# Patient Record
Sex: FEMALE | Race: WHITE | NOT HISPANIC OR LATINO | Employment: FULL TIME | URBAN - METROPOLITAN AREA
[De-identification: names, ages, dates, MRNs, and addresses within clinical notes are randomized per-mention and may not be internally consistent; named-entity substitution may affect disease eponyms.]

---

## 2019-12-05 ENCOUNTER — SOCIAL WORK (OUTPATIENT)
Dept: ADMINISTRATIVE | Facility: OTHER | Age: 29
End: 2019-12-05

## 2019-12-05 NOTE — PROGRESS NOTES
SW met with pt regarding initial OB assessment. Pt stated this is her 1st pregnancy/0-miscarriage. Pt stated lives with her fiancee and able to perform ADL's independently. Pt stated support system is her josé[employer]. Pt stated has medicaid([employer]). Pt stated does not have WIC. SW provide pt with information on other community resources and referred to the [facility].SW faxed and scanned pt's notification of pregnancy into epic.  No other needs identified at this time.    Carol Mclaughlin,MSW  Pager#[phone]

## 2019-12-30 ENCOUNTER — SOCIAL WORK (OUTPATIENT)
Dept: ADMINISTRATIVE | Facility: OTHER | Age: 29
End: 2019-12-30

## 2019-12-30 PROBLEM — O99.111 HETEROZYGOUS FACTOR V LEIDEN AFFECTING PREGNANCY IN FIRST TRIMESTER, ANTEPARTUM: Status: ACTIVE | Noted: 2019-12-30

## 2019-12-30 PROBLEM — Z3A.10 10 WEEKS GESTATION OF PREGNANCY: Status: ACTIVE | Noted: 2019-12-30

## 2019-12-30 PROBLEM — I10 CHRONIC HYPERTENSION: Status: ACTIVE | Noted: 2019-12-30

## 2019-12-30 PROBLEM — D68.51 HETEROZYGOUS FACTOR V LEIDEN AFFECTING PREGNANCY IN FIRST TRIMESTER, ANTEPARTUM: Status: ACTIVE | Noted: 2019-12-30

## 2019-12-30 PROBLEM — O09.91 PREGNANCY, SUPERVISION, HIGH-RISK, FIRST TRIMESTER: Status: ACTIVE | Noted: 2019-12-30

## 2019-12-30 NOTE — PROGRESS NOTES
SW met with pt regarding  prior authorization for her medication(Enoxaparin).  SW completed demographic sheet of the prior authorization form and gave the form to MD for review/signature. SW later received pt's completed prior authorization form from MD and fax [phone])[employer]. No other needs identified at this time.    Carol Mclaughlin,MSW  Pager#:[phone]

## 2020-01-03 ENCOUNTER — SOCIAL WORK (OUTPATIENT)
Dept: ADMINISTRATIVE | Facility: OTHER | Age: 30
End: 2020-01-03

## 2020-01-03 NOTE — PROGRESS NOTES
SW received approval letter from pt's insurance for medication(Enoxaparin) and approve for 213 days. SW made phone call to pt([phone])informed her that the medication(Enoxaparin) has been approved. JULIA also notify Outpatient [facility]charge [facility] and MD regarding the above.JULIA scanned the approval letter into epic. No other needs identified at this time.    VICKEY Upton  Pager#[phone]

## 2020-01-14 PROBLEM — Z3A.12 12 WEEKS GESTATION OF PREGNANCY: Status: ACTIVE | Noted: 2019-12-30

## 2020-02-11 PROBLEM — O09.92 SUPERVISION OF HIGH RISK PREGNANCY IN SECOND TRIMESTER: Status: ACTIVE | Noted: 2019-12-30

## 2020-02-11 PROBLEM — Z3A.16 16 WEEKS GESTATION OF PREGNANCY: Status: ACTIVE | Noted: 2019-12-30

## 2020-03-03 PROBLEM — R68.89 FLU-LIKE SYMPTOMS: Status: ACTIVE | Noted: 2020-03-03

## 2020-03-03 PROBLEM — Z3A.19 19 WEEKS GESTATION OF PREGNANCY: Status: ACTIVE | Noted: 2019-12-30

## 2020-04-14 PROBLEM — R68.89 FLU-LIKE SYMPTOMS: Status: RESOLVED | Noted: 2020-03-03 | Resolved: 2020-04-14

## 2020-04-14 PROBLEM — Z3A.25 25 WEEKS GESTATION OF PREGNANCY: Status: ACTIVE | Noted: 2019-12-30

## 2020-04-14 PROBLEM — O99.810 ABNORMAL O'SULLIVAN GLUCOSE CHALLENGE TEST, ANTEPARTUM: Status: ACTIVE | Noted: 2020-04-14

## 2020-05-26 PROBLEM — O09.93 SUPERVISION OF HIGH RISK PREGNANCY IN THIRD TRIMESTER: Status: ACTIVE | Noted: 2019-12-30

## 2020-05-26 PROBLEM — Z3A.31 31 WEEKS GESTATION OF PREGNANCY: Status: ACTIVE | Noted: 2019-12-30

## 2020-06-02 PROBLEM — Z3A.31 31 WEEKS GESTATION OF PREGNANCY: Status: RESOLVED | Noted: 2019-12-30 | Resolved: 2020-06-02

## 2020-06-09 PROBLEM — Z3A.33 33 WEEKS GESTATION OF PREGNANCY: Status: ACTIVE | Noted: 2020-06-09

## 2020-06-16 PROBLEM — Z3A.34 34 WEEKS GESTATION OF PREGNANCY: Status: ACTIVE | Noted: 2020-06-09

## 2020-06-23 PROBLEM — Z3A.34 34 WEEKS GESTATION OF PREGNANCY: Status: RESOLVED | Noted: 2020-06-09 | Resolved: 2020-06-23

## 2020-07-14 PROBLEM — Z3A.38 38 WEEKS GESTATION OF PREGNANCY: Status: ACTIVE | Noted: 2020-07-14

## 2020-07-15 PROBLEM — Z30.9 CONTRACEPTION MANAGEMENT: Status: ACTIVE | Noted: 2020-07-15

## 2020-07-15 PROBLEM — Z3A.38 38 WEEKS GESTATION OF PREGNANCY: Status: RESOLVED | Noted: 2020-07-14 | Resolved: 2020-07-15

## 2020-07-18 PROBLEM — U07.1 COVID-19 VIRUS INFECTION: Status: ACTIVE | Noted: 2020-07-18

## 2020-07-18 PROBLEM — Z01.30 BLOOD PRESSURE CHECK: Status: ACTIVE | Noted: 2020-07-18

## 2020-08-24 PROBLEM — O09.93 SUPERVISION OF HIGH RISK PREGNANCY IN THIRD TRIMESTER: Status: RESOLVED | Noted: 2019-12-30 | Resolved: 2020-08-24

## 2020-10-19 PROBLEM — R10.2 VAGINAL PAIN: Status: ACTIVE | Noted: 2020-10-19

## 2020-11-29 PROBLEM — Z91.89 AT HIGH RISK FOR BREAST CANCER: Status: ACTIVE | Noted: 2019-08-26

## 2020-11-29 PROBLEM — O99.810 ABNORMAL O'SULLIVAN GLUCOSE CHALLENGE TEST, ANTEPARTUM: Status: RESOLVED | Noted: 2020-04-14 | Resolved: 2020-11-29

## 2020-11-29 PROBLEM — Z83.2 FAMILY HISTORY OF HYPERCOAGULABILITY: Status: ACTIVE | Noted: 2018-05-09

## 2020-11-29 PROBLEM — Z01.30 BLOOD PRESSURE CHECK: Status: RESOLVED | Noted: 2020-07-18 | Resolved: 2020-11-29

## 2020-11-29 PROBLEM — Z30.8 ENCOUNTER FOR OTHER CONTRACEPTIVE MANAGEMENT: Status: ACTIVE | Noted: 2020-07-15

## 2021-05-12 ENCOUNTER — PATIENT MESSAGE (OUTPATIENT)
Dept: RESEARCH | Facility: HOSPITAL | Age: 31
End: 2021-05-12

## 2021-11-12 PROBLEM — Z13.71 BRCA NEGATIVE: Status: ACTIVE | Noted: 2021-11-12

## 2021-12-03 PROBLEM — Z80.3 FAMILY HISTORY OF BREAST CANCER: Status: ACTIVE | Noted: 2021-12-03

## 2022-02-08 PROBLEM — O36.80X0 PREGNANCY OF UNKNOWN ANATOMIC LOCATION: Status: ACTIVE | Noted: 2022-02-08

## 2022-02-10 PROBLEM — O00.90 ECTOPIC PREGNANCY: Status: ACTIVE | Noted: 2022-02-10

## 2022-02-14 PROBLEM — Z13.71 BRCA NEGATIVE: Status: RESOLVED | Noted: 2021-11-12 | Resolved: 2022-02-14

## 2022-02-22 PROBLEM — O36.80X0 PREGNANCY OF UNKNOWN ANATOMIC LOCATION: Status: RESOLVED | Noted: 2022-02-08 | Resolved: 2022-02-22
